# Patient Record
Sex: MALE | Race: WHITE | URBAN - METROPOLITAN AREA
[De-identification: names, ages, dates, MRNs, and addresses within clinical notes are randomized per-mention and may not be internally consistent; named-entity substitution may affect disease eponyms.]

---

## 2018-05-09 ENCOUNTER — NEW PATIENT (OUTPATIENT)
Dept: URBAN - METROPOLITAN AREA CLINIC 24 | Facility: CLINIC | Age: 68
End: 2018-05-09
Payer: COMMERCIAL

## 2018-05-09 DIAGNOSIS — E11.9 TYPE 2 DIABETES MELLITUS WITHOUT COMPLICATIONS: Primary | ICD-10-CM

## 2018-05-09 PROCEDURE — 92004 COMPRE OPH EXAM NEW PT 1/>: CPT | Performed by: OPTOMETRIST

## 2018-05-09 PROCEDURE — 92250 FUNDUS PHOTOGRAPHY W/I&R: CPT | Performed by: OPTOMETRIST

## 2018-05-09 ASSESSMENT — INTRAOCULAR PRESSURE
OD: 13
OS: 13

## 2018-05-09 ASSESSMENT — VISUAL ACUITY
OD: 20/25
OS: 20/50

## 2018-05-09 ASSESSMENT — KERATOMETRY
OD: 45.13
OS: 45.21

## 2018-06-25 ENCOUNTER — Encounter (OUTPATIENT)
Dept: URBAN - METROPOLITAN AREA CLINIC 24 | Facility: CLINIC | Age: 68
End: 2018-06-25
Payer: COMMERCIAL

## 2018-06-25 DIAGNOSIS — H25.813 COMBINED FORMS OF AGE-RELATED CATARACT, BILATERAL: Primary | ICD-10-CM

## 2018-06-25 DIAGNOSIS — Z01.818 ENCOUNTER FOR OTHER PREPROCEDURAL EXAMINATION: Primary | ICD-10-CM

## 2018-06-25 PROCEDURE — 99213 OFFICE O/P EST LOW 20 MIN: CPT | Performed by: PHYSICIAN ASSISTANT

## 2018-06-25 PROCEDURE — 92025 CPTRIZED CORNEAL TOPOGRAPHY: CPT | Performed by: OPHTHALMOLOGY

## 2018-06-26 ENCOUNTER — FOLLOW UP ESTABLISHED (OUTPATIENT)
Dept: URBAN - METROPOLITAN AREA CLINIC 26 | Facility: CLINIC | Age: 68
End: 2018-06-26
Payer: COMMERCIAL

## 2018-06-26 DIAGNOSIS — H25.812 COMBINED FORMS OF AGE-RELATED CATARACT, LEFT EYE: Primary | ICD-10-CM

## 2018-06-26 DIAGNOSIS — H25.811 COMBINED FORMS OF AGE-RELATED CATARACT, RIGHT EYE: ICD-10-CM

## 2018-06-26 PROCEDURE — 92012 INTRM OPH EXAM EST PATIENT: CPT | Performed by: OPHTHALMOLOGY

## 2018-06-26 PROCEDURE — 76519 ECHO EXAM OF EYE: CPT | Performed by: OPHTHALMOLOGY

## 2018-06-26 PROCEDURE — 92002 INTRM OPH EXAM NEW PATIENT: CPT | Performed by: OPHTHALMOLOGY

## 2018-06-26 ASSESSMENT — INTRAOCULAR PRESSURE
OS: 13
OD: 13

## 2018-07-03 ENCOUNTER — SURGERY (OUTPATIENT)
Dept: URBAN - METROPOLITAN AREA SURGERY 12 | Facility: SURGERY | Age: 68
End: 2018-07-03
Payer: COMMERCIAL

## 2018-07-03 PROCEDURE — 66984 XCAPSL CTRC RMVL W/O ECP: CPT | Performed by: OPHTHALMOLOGY

## 2018-07-04 ENCOUNTER — POST OP (OUTPATIENT)
Dept: URBAN - METROPOLITAN AREA CLINIC 10 | Facility: CLINIC | Age: 68
End: 2018-07-04

## 2018-07-04 DIAGNOSIS — Z96.1 PRESENCE OF INTRAOCULAR LENS: Primary | ICD-10-CM

## 2018-07-04 PROCEDURE — 99024 POSTOP FOLLOW-UP VISIT: CPT | Performed by: OPTOMETRIST

## 2018-07-04 ASSESSMENT — INTRAOCULAR PRESSURE: OS: 16

## 2018-07-11 ENCOUNTER — POST OP (OUTPATIENT)
Dept: URBAN - METROPOLITAN AREA CLINIC 24 | Facility: CLINIC | Age: 68
End: 2018-07-11

## 2018-07-11 PROCEDURE — 99024 POSTOP FOLLOW-UP VISIT: CPT | Performed by: OPTOMETRIST

## 2018-07-11 ASSESSMENT — VISUAL ACUITY
OS: 20/20
OD: 20/25

## 2018-07-11 ASSESSMENT — INTRAOCULAR PRESSURE
OD: 11
OS: 10

## 2018-07-17 ENCOUNTER — SURGERY (OUTPATIENT)
Dept: URBAN - METROPOLITAN AREA SURGERY 12 | Facility: SURGERY | Age: 68
End: 2018-07-17
Payer: COMMERCIAL

## 2018-07-17 PROCEDURE — 66984 XCAPSL CTRC RMVL W/O ECP: CPT | Performed by: OPHTHALMOLOGY

## 2018-07-18 ENCOUNTER — POST OP (OUTPATIENT)
Dept: URBAN - METROPOLITAN AREA CLINIC 24 | Facility: CLINIC | Age: 68
End: 2018-07-18

## 2018-07-18 PROCEDURE — 99024 POSTOP FOLLOW-UP VISIT: CPT | Performed by: OPTOMETRIST

## 2018-07-18 ASSESSMENT — INTRAOCULAR PRESSURE: OD: 16

## 2018-08-22 ENCOUNTER — POST OP (OUTPATIENT)
Dept: URBAN - METROPOLITAN AREA CLINIC 24 | Facility: CLINIC | Age: 68
End: 2018-08-22

## 2018-08-22 PROCEDURE — 99024 POSTOP FOLLOW-UP VISIT: CPT | Performed by: OPTOMETRIST

## 2018-08-22 ASSESSMENT — INTRAOCULAR PRESSURE
OS: 14
OD: 14

## 2019-07-29 ENCOUNTER — FOLLOW UP ESTABLISHED (OUTPATIENT)
Dept: URBAN - METROPOLITAN AREA CLINIC 24 | Facility: CLINIC | Age: 69
End: 2019-07-29
Payer: MEDICARE

## 2019-07-29 DIAGNOSIS — H43.393 OTHER VITREOUS OPACITIES, BILATERAL: ICD-10-CM

## 2019-07-29 DIAGNOSIS — H04.123 TEAR FILM INSUFFICIENCY OF BILATERAL LACRIMAL GLANDS: ICD-10-CM

## 2019-07-29 PROCEDURE — 92014 COMPRE OPH EXAM EST PT 1/>: CPT | Performed by: OPTOMETRIST

## 2019-07-29 PROCEDURE — 92134 CPTRZ OPH DX IMG PST SGM RTA: CPT | Performed by: OPTOMETRIST

## 2019-07-29 ASSESSMENT — INTRAOCULAR PRESSURE
OS: 14
OD: 13

## 2019-07-29 ASSESSMENT — KERATOMETRY
OS: 45.21
OD: 45.18

## 2019-07-29 ASSESSMENT — VISUAL ACUITY
OD: 20/20
OS: 20/20

## 2021-09-13 ENCOUNTER — OFFICE VISIT (OUTPATIENT)
Dept: URBAN - METROPOLITAN AREA CLINIC 24 | Facility: CLINIC | Age: 71
End: 2021-09-13
Payer: MEDICARE

## 2021-09-13 DIAGNOSIS — H16.143 PUNCTATE KERATITIS, BILATERAL: ICD-10-CM

## 2021-09-13 DIAGNOSIS — H26.493 OTHER SECONDARY CATARACT, BILATERAL: ICD-10-CM

## 2021-09-13 DIAGNOSIS — Z79.84 LONG TERM (CURRENT) USE OF ORAL HYPOGLYCEMIC DRUGS: ICD-10-CM

## 2021-09-13 DIAGNOSIS — H43.813 VITREOUS DEGENERATION, BILATERAL: ICD-10-CM

## 2021-09-13 PROCEDURE — 92014 COMPRE OPH EXAM EST PT 1/>: CPT | Performed by: OPTOMETRIST

## 2021-09-13 PROCEDURE — 92134 CPTRZ OPH DX IMG PST SGM RTA: CPT | Performed by: OPTOMETRIST

## 2021-09-13 ASSESSMENT — INTRAOCULAR PRESSURE
OS: 10
OD: 10

## 2021-09-13 ASSESSMENT — VISUAL ACUITY
OD: 20/20
OS: 20/20

## 2022-06-15 ENCOUNTER — APPOINTMENT (OUTPATIENT)
Age: 72
Setting detail: DERMATOLOGY
End: 2022-06-15

## 2022-06-15 DIAGNOSIS — L57.0 ACTINIC KERATOSIS: ICD-10-CM

## 2022-06-15 DIAGNOSIS — D18.0 HEMANGIOMA: ICD-10-CM

## 2022-06-15 DIAGNOSIS — L82.1 OTHER SEBORRHEIC KERATOSIS: ICD-10-CM

## 2022-06-15 DIAGNOSIS — L81.4 OTHER MELANIN HYPERPIGMENTATION: ICD-10-CM

## 2022-06-15 DIAGNOSIS — D22 MELANOCYTIC NEVI: ICD-10-CM

## 2022-06-15 DIAGNOSIS — D485 NEOPLASM OF UNCERTAIN BEHAVIOR OF SKIN: ICD-10-CM

## 2022-06-15 PROBLEM — D18.01 HEMANGIOMA OF SKIN AND SUBCUTANEOUS TISSUE: Status: ACTIVE | Noted: 2022-06-15

## 2022-06-15 PROBLEM — D22.5 MELANOCYTIC NEVI OF TRUNK: Status: ACTIVE | Noted: 2022-06-15

## 2022-06-15 PROBLEM — D48.5 NEOPLASM OF UNCERTAIN BEHAVIOR OF SKIN: Status: ACTIVE | Noted: 2022-06-15

## 2022-06-15 PROCEDURE — OTHER LIQUID NITROGEN: OTHER

## 2022-06-15 PROCEDURE — 99203 OFFICE O/P NEW LOW 30 MIN: CPT | Mod: 25

## 2022-06-15 PROCEDURE — OTHER ORDER ULTRASOUND: OTHER

## 2022-06-15 PROCEDURE — 17000 DESTRUCT PREMALG LESION: CPT

## 2022-06-15 PROCEDURE — OTHER COUNSELING: OTHER

## 2022-06-15 ASSESSMENT — LOCATION DETAILED DESCRIPTION DERM
LOCATION DETAILED: LEFT MID-UPPER BACK
LOCATION DETAILED: RIGHT RIB CAGE
LOCATION DETAILED: SUBXIPHOID
LOCATION DETAILED: LEFT SUPERIOR POSTERIOR HELIX
LOCATION DETAILED: LEFT INFERIOR CENTRAL MALAR CHEEK
LOCATION DETAILED: LEFT PROXIMAL DORSAL FOREARM

## 2022-06-15 ASSESSMENT — LOCATION SIMPLE DESCRIPTION DERM
LOCATION SIMPLE: ABDOMEN
LOCATION SIMPLE: LEFT CHEEK
LOCATION SIMPLE: LEFT EAR
LOCATION SIMPLE: LEFT UPPER BACK
LOCATION SIMPLE: LEFT FOREARM

## 2022-06-15 ASSESSMENT — LOCATION ZONE DERM
LOCATION ZONE: ARM
LOCATION ZONE: TRUNK
LOCATION ZONE: FACE
LOCATION ZONE: EAR

## 2022-06-15 NOTE — PROCEDURE: LIQUID NITROGEN
Duration Of Freeze Thaw-Cycle (Seconds): 0
Consent: The patient's consent was obtained including but not limited to risks of crusting, scabbing, blistering, scarring, darker or lighter pigmentary change, recurrence, incomplete removal and infection.
Render Post-Care Instructions In Note?: no
Post-Care Instructions: I reviewed with the patient in detail post-care instructions. Patient is to wear sunprotection, and avoid picking at any of the treated lesions. Pt may apply Vaseline to crusted or scabbing areas.
Show Applicator Variable?: Yes
Detail Level: Detailed

## 2022-06-15 NOTE — PROCEDURE: ORDER ULTRASOUND
Provider: Jesus Guevara, DO
Priority: normal
Ultrasound Protocol: Ultrasound of Subcutaneous Mass
Detail Level: Detailed
Subcutaneous Lesion Ultrasound Reason: 13mm subcutaneous violaceous compressible nodule
Lesion Location: left proximal dorsal forearm

## 2022-06-15 NOTE — PROCEDURE: COUNSELING
Detail Level: Generalized
Patient Specific Counseling (Will Not Stick From Patient To Patient): Discussed with patient that this appears to be a vascular growth.  An ultrasound will be ordered to determine the nature of this growth.  Based upon the outcome of the study a determination will be made as to how to best treat this.  Patient verbalizes understanding.
Detail Level: Detailed

## 2022-09-13 ENCOUNTER — OFFICE VISIT (OUTPATIENT)
Dept: URBAN - METROPOLITAN AREA CLINIC 24 | Facility: CLINIC | Age: 72
End: 2022-09-13
Payer: MEDICARE

## 2022-09-13 DIAGNOSIS — H43.813 VITREOUS DEGENERATION, BILATERAL: ICD-10-CM

## 2022-09-13 DIAGNOSIS — Z79.84 LONG TERM (CURRENT) USE OF ORAL HYPOGLYCEMIC DRUGS: ICD-10-CM

## 2022-09-13 DIAGNOSIS — H26.493 OTHER SECONDARY CATARACT, BILATERAL: ICD-10-CM

## 2022-09-13 DIAGNOSIS — H35.342 MACULAR CYST, HOLE, OR PSEUDOHOLE, LEFT EYE: ICD-10-CM

## 2022-09-13 DIAGNOSIS — E11.9 TYPE 2 DIABETES MELLITUS WITHOUT COMPLICATIONS: Primary | ICD-10-CM

## 2022-09-13 PROCEDURE — 92134 CPTRZ OPH DX IMG PST SGM RTA: CPT | Performed by: OPTOMETRIST

## 2022-09-13 PROCEDURE — 92014 COMPRE OPH EXAM EST PT 1/>: CPT | Performed by: OPTOMETRIST

## 2022-09-13 ASSESSMENT — VISUAL ACUITY
OD: 20/20
OS: 20/20

## 2022-09-13 ASSESSMENT — INTRAOCULAR PRESSURE
OD: 15
OS: 15

## 2022-09-13 NOTE — IMPRESSION/PLAN
Impression: Macular cyst, hole, or pseudohole, left eye: H35.342. Plan: Faint lamellar changes OS. Pt edu. PPVIT not indicated. 
Monitor; notify clinic any increased symptoms

## 2023-06-03 NOTE — IMPRESSION/PLAN
Impression: Long term (current) use of oral antidiabetic drugs Plan: See diabetic plan above
Impression: Other secondary cataract, bilateral: H26.493. Plan: Opacified capsule not affecting vision. No indication for treatment. Return if decreased vision.
Impression: Punctate keratitis, bilateral: H16.143.  Plan: likely contributes to am blur; recommend afts
Impression: Type 2 diabetes mellitus without complications Plan: No diabetic retinopathy. Recommend yearly diabetic eye exam.  Discussed with patient the importance of good blood sugar control.
199.9

## 2023-09-14 ENCOUNTER — OFFICE VISIT (OUTPATIENT)
Dept: URBAN - METROPOLITAN AREA CLINIC 24 | Facility: CLINIC | Age: 73
End: 2023-09-14
Payer: MEDICARE

## 2023-09-14 DIAGNOSIS — H26.493 OTHER SECONDARY CATARACT, BILATERAL: ICD-10-CM

## 2023-09-14 DIAGNOSIS — E11.9 TYPE 2 DIABETES MELLITUS WITHOUT COMPLICATIONS: Primary | ICD-10-CM

## 2023-09-14 DIAGNOSIS — H35.342 MACULAR CYST, HOLE, OR PSEUDOHOLE, LEFT EYE: ICD-10-CM

## 2023-09-14 DIAGNOSIS — Z79.84 LONG TERM (CURRENT) USE OF ORAL HYPOGLYCEMIC DRUGS: ICD-10-CM

## 2023-09-14 PROCEDURE — 92014 COMPRE OPH EXAM EST PT 1/>: CPT | Performed by: OPTOMETRIST

## 2023-09-14 PROCEDURE — 92134 CPTRZ OPH DX IMG PST SGM RTA: CPT | Performed by: OPTOMETRIST

## 2023-09-14 ASSESSMENT — VISUAL ACUITY
OD: 20/20
OS: 20/20

## 2023-09-14 ASSESSMENT — INTRAOCULAR PRESSURE
OD: 15
OS: 16